# Patient Record
Sex: FEMALE | Race: ASIAN | Employment: STUDENT | URBAN - METROPOLITAN AREA
[De-identification: names, ages, dates, MRNs, and addresses within clinical notes are randomized per-mention and may not be internally consistent; named-entity substitution may affect disease eponyms.]

---
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
## 2024-01-09 ENCOUNTER — TELEPHONE (OUTPATIENT)
Dept: BEHAVIORAL HEALTH | Age: 19
End: 2024-01-09

## 2024-01-22 ENCOUNTER — EXTERNAL RECORD (OUTPATIENT)
Dept: HEALTH INFORMATION MANAGEMENT | Facility: OTHER | Age: 19
End: 2024-01-22

## 2024-01-22 ENCOUNTER — TELEPHONE (OUTPATIENT)
Dept: BEHAVIORAL HEALTH | Age: 19
End: 2024-01-22

## 2024-01-23 ENCOUNTER — EXTERNAL RECORD (OUTPATIENT)
Dept: HEALTH INFORMATION MANAGEMENT | Facility: OTHER | Age: 19
End: 2024-01-23

## 2024-01-23 ENCOUNTER — EXTERNAL LAB (OUTPATIENT)
Dept: OTHER | Age: 19
End: 2024-01-23

## 2024-01-23 LAB
ALBUMIN SERPL-MCNC: 4.8 GM/DL (ref 3.2–4.9)
ALP SERPL-CCNC: 42 U/L (ref 34–143)
ALT SERPL-CCNC: 13 U/L (ref 0–42)
AST SERPL-CCNC: 27 U/L (ref 9–35)
BILIRUB SERPL-MCNC: 0.73 MG/DL (ref 0.2–1.3)
BUN SERPL-MCNC: 14 MG/DL (ref 5–20)
CALCIUM SERPL-MCNC: 9.8 MG/DL (ref 8.7–10.5)
CHLORIDE SERPL-SCNC: 105 MEQ/L (ref 99–110)
CHOLEST SERPL-MCNC: 137 MG/DL (ref 110–200)
CHOLEST/HDLC SERPL: 2.39 RATIO
CO2 SERPL-SCNC: 28 MM/L (ref 20–30)
CREAT SERPL-MCNC: 0.71 MG/DL (ref 0.44–1.32)
ERYTHROCYTE [DISTWIDTH] IN BLOOD: 12.7 % (ref 11.5–15.2)
FOLATE SERPL-MCNC: >24 NG/ML (ref 3.37–24)
GLUCOSE SERPL-MCNC: 111 MG/DL (ref 70–110)
HCT VFR BLD CALC: 39.9 % (ref 37–47)
HDLC SERPL-MCNC: 57 MG/DL (ref 50–96)
HGB BLD-MCNC: 13.1 GM/DL (ref 12–16)
LDL/HDL (OFFPRE3): 1.14 RATIO
LDLC SERPL CALC-MCNC: 65 MG/DL (ref 50–130)
LENGTH OF FAST TIME PATIENT: ABNORMAL H
LENGTH OF FAST TIME PATIENT: NORMAL H
MCH RBC QN AUTO: 30.5 UUG (ref 17–33)
MCHC RBC AUTO-ENTMCNC: 32.8 GM/DL (ref 32–36)
MCV RBC AUTO: 93 UM3 (ref 81–99)
MPV (OFFPRE2): 9.6 UM3 (ref 9.5–13.1)
PLATELET # BLD: 219 THO/MM3 (ref 150–400)
POTASSIUM SERPL-SCNC: 4.4 MEQ/L (ref 3.6–5)
PROT SERPL-MCNC: 7.5 GM/DL (ref 6–8.2)
RBC # BLD: 4.29 MIL/MM3 (ref 4.2–5.4)
SODIUM SERPL-SCNC: 140 MEQ/L (ref 138–147)
TRIGL SERPL-MCNC: 72 MG/DL (ref 10–150)
TSH SERPL-ACNC: 0.79 UIU/ML (ref 0.55–4.78)
VALPROATE SERPL-MCNC: 26.7 SEE REPORT FOR UNITS (ref 50–100)
VIT B12 SERPL-MCNC: 1109 PG/ML (ref 211–911)
WBC # BLD: 4.49 THO/MM3 (ref 4.8–10.8)

## 2024-01-24 ENCOUNTER — EXTERNAL RECORD (OUTPATIENT)
Dept: HEALTH INFORMATION MANAGEMENT | Facility: OTHER | Age: 19
End: 2024-01-24

## 2024-01-25 ENCOUNTER — EXTERNAL RECORD (OUTPATIENT)
Dept: HEALTH INFORMATION MANAGEMENT | Facility: OTHER | Age: 19
End: 2024-01-25

## 2024-02-08 ENCOUNTER — APPOINTMENT (OUTPATIENT)
Dept: BEHAVIORAL HEALTH | Age: 19
End: 2024-02-08

## 2024-02-08 DIAGNOSIS — F42.2 MIXED OBSESSIONAL THOUGHTS AND ACTS: ICD-10-CM

## 2024-02-08 DIAGNOSIS — F84.0 AUTISM SPECTRUM DISORDER: Primary | ICD-10-CM

## 2024-02-08 DIAGNOSIS — Z79.899 MEDICATION MANAGEMENT: ICD-10-CM

## 2024-02-08 RX ORDER — PERPHENAZINE 8 MG/1
16 TABLET ORAL NIGHTLY
COMMUNITY
Start: 2024-02-03

## 2024-02-08 RX ORDER — DIVALPROEX SODIUM 125 MG/1
125 TABLET, DELAYED RELEASE ORAL NIGHTLY
COMMUNITY
Start: 2024-02-03

## 2024-02-08 RX ORDER — FLUVOXAMINE MALEATE 50 MG/1
50 TABLET, COATED ORAL NIGHTLY
COMMUNITY
Start: 2024-02-03

## 2024-02-08 RX ORDER — FLUVOXAMINE MALEATE 25 MG
25 TABLET ORAL NIGHTLY
COMMUNITY
Start: 2024-02-03 | End: 2024-02-08 | Stop reason: DRUGHIGH

## 2024-02-08 RX ORDER — DIVALPROEX SODIUM 250 MG/1
250 TABLET, DELAYED RELEASE ORAL NIGHTLY
COMMUNITY
Start: 2024-02-03

## 2024-02-29 ENCOUNTER — APPOINTMENT (OUTPATIENT)
Dept: BEHAVIORAL HEALTH | Age: 19
End: 2024-02-29

## 2024-02-29 DIAGNOSIS — Z79.899 MEDICATION MANAGEMENT: ICD-10-CM

## 2024-02-29 DIAGNOSIS — F42.2 MIXED OBSESSIONAL THOUGHTS AND ACTS: ICD-10-CM

## 2024-02-29 DIAGNOSIS — F84.0 AUTISM SPECTRUM DISORDER: Primary | ICD-10-CM

## 2024-02-29 RX ORDER — CHLORPROMAZINE HYDROCHLORIDE 100 MG/1
100 TABLET, FILM COATED ORAL NIGHTLY
COMMUNITY
Start: 2024-02-28

## 2024-02-29 RX ORDER — PALIPERIDONE 9 MG/1
9 TABLET, EXTENDED RELEASE ORAL EVERY MORNING
Qty: 90 TABLET | Refills: 0 | Status: SHIPPED | COMMUNITY
Start: 2024-02-29

## 2024-02-29 RX ORDER — DIVALPROEX SODIUM 500 MG/1
1000 TABLET, EXTENDED RELEASE ORAL NIGHTLY
COMMUNITY
Start: 2024-02-28

## 2024-02-29 RX ORDER — PALIPERIDONE 3 MG/1
3 TABLET, EXTENDED RELEASE ORAL EVERY MORNING
Qty: 90 TABLET | Refills: 0 | Status: SHIPPED | COMMUNITY
Start: 2024-02-29

## 2024-03-07 ENCOUNTER — EXTERNAL RECORD (OUTPATIENT)
Dept: HEALTH INFORMATION MANAGEMENT | Facility: OTHER | Age: 19
End: 2024-03-07

## 2024-03-13 ENCOUNTER — EXTERNAL LAB (OUTPATIENT)
Dept: HEALTH INFORMATION MANAGEMENT | Facility: OTHER | Age: 19
End: 2024-03-13

## 2024-03-13 LAB
ALBUMIN SERPL-MCNC: 4.3 G/DL (ref 4–5)
ALBUMIN/GLOB SERPL: 2 {RATIO} (ref 1.2–2.2)
ALP SERPL-CCNC: 37 IU/L (ref 42–106)
ALT SERPL-CCNC: 6 IU/L (ref 0–32)
ANION GAP SERPL CALC-SCNC: 11 MMOL/L (ref 10–18)
AST SERPL-CCNC: 13 IU/L (ref 0–40)
BASOPHILS # BLD: 0 X10E3/UL (ref 0–0.2)
BASOPHILS NFR BLD: 0 %
BILIRUB CONJ SERPL-MCNC: 0.11 MG/DL (ref 0–0.4)
BILIRUB SERPL-MCNC: 0.4 MG/DL (ref 0–1.2)
BUN SERPL-MCNC: 10 MG/DL (ref 6–20)
BUN/CREAT SERPL: 13 (ref 9–23)
CALCIUM SERPL-MCNC: 9.2 MG/DL (ref 8.7–10.2)
CHLORIDE SERPL-SCNC: 103 MMOL/L (ref 96–106)
CHOLEST SERPL-MCNC: 130 MG/DL (ref 100–169)
CO2 SERPL-SCNC: 25 MMOL/L (ref 20–29)
CREAT SERPL-MCNC: 0.78 MG/DL (ref 0.57–1)
EOSINOPHIL # BLD: 0 X10E3/UL (ref 0–0.4)
EOSINOPHIL NFR BLD: 0 %
ERYTHROCYTE [DISTWIDTH] IN BLOOD: 13.2 % (ref 11.7–15.4)
GFR SERPLBLD SCHWARTZ-ARVRAT: 112 ML/MIN/1.73
GLOBULIN SER-MCNC: 2.2 G/DL (ref 1.5–4.5)
GLUCOSE SERPL-MCNC: 88 MG/DL (ref 70–99)
HBA1C MFR BLD: 4.9 % (ref 4.8–5.6)
HCT VFR BLD CALC: 34.1 % (ref 34–46.6)
HDLC SERPL-MCNC: 49 MG/DL
HGB BLD-MCNC: 11.4 G/DL (ref 11.1–15.9)
LDH SERPL L TO P-CCNC: 72 IU/L (ref 119–226)
LDLC SERPL CALC-MCNC: 67 MG/DL (ref 0–109)
LENGTH OF FAST TIME PATIENT: NO H
LENGTH OF FAST TIME PATIENT: NO H
LYMPHOCYTES # BLD: 1.7 X10E3/UL (ref 0.7–3.1)
LYMPHOCYTES NFR BLD: 35 %
MCH RBC QN AUTO: 29.6 PG (ref 26.6–33)
MCHC RBC AUTO-ENTMCNC: 33.4 G/DL (ref 31.5–35.7)
MCV RBC AUTO: 89 FL (ref 79–97)
MONOCYTES # BLD: 0.6 X10E3/UL (ref 0.1–0.9)
MONOCYTES NFR BLD: 12 %
NEUTROPHILS # BLD: 2.5 X10E3/UL (ref 1.4–7)
NEUTROPHILS NFR BLD: 53 %
PHOSPHATE SERPL-MCNC: 3.9 MG/DL (ref 3.3–5.1)
PLATELET # BLD: 137 X10E3/UL (ref 150–450)
POTASSIUM SERPL-SCNC: 4.1 MMOL/L (ref 3.5–5.2)
PROT SERPL-MCNC: 6.5 G/DL (ref 6–8.5)
RBC # BLD: 3.85 X10E6/UL (ref 3.77–5.28)
RPR SER QL: NON REACTIVE
SODIUM SERPL-SCNC: 139 MMOL/L (ref 134–144)
T4 FREE SERPL-MCNC: 1.4 NG/DL (ref 0.93–1.6)
TRIGL SERPL-MCNC: 66 MG/DL (ref 0–89)
TSH SERPL-ACNC: 1.72 UIU/ML (ref 0.45–4.5)
URATE SERPL-MCNC: 4.5 MG/DL (ref 2.6–6.2)
VALPROATE SERPL-MCNC: 116 UG/ML (ref 50–100)
VLDLC SERPL CALC-MCNC: 14 MG/DL (ref 5–40)
WBC # BLD: 4.8 X10E3/UL (ref 3.4–10.8)

## 2024-03-19 ENCOUNTER — APPOINTMENT (OUTPATIENT)
Dept: BEHAVIORAL HEALTH | Age: 19
End: 2024-03-19

## 2024-03-19 ENCOUNTER — EXTERNAL RECORD (OUTPATIENT)
Dept: HEALTH INFORMATION MANAGEMENT | Facility: OTHER | Age: 19
End: 2024-03-19

## 2024-03-19 DIAGNOSIS — Z79.899 MEDICATION MANAGEMENT: ICD-10-CM

## 2024-03-19 DIAGNOSIS — F84.0 AUTISM SPECTRUM DISORDER: Primary | ICD-10-CM

## 2024-03-19 DIAGNOSIS — F42.2 MIXED OBSESSIONAL THOUGHTS AND ACTS: ICD-10-CM

## 2024-03-23 ENCOUNTER — EXTERNAL LAB (OUTPATIENT)
Dept: HEALTH INFORMATION MANAGEMENT | Facility: OTHER | Age: 19
End: 2024-03-23

## 2024-03-23 LAB — VALPROATE SERPL-MCNC: 89 UG/ML (ref 50–100)

## 2024-03-28 ENCOUNTER — EXTERNAL LAB (OUTPATIENT)
Dept: HEALTH INFORMATION MANAGEMENT | Facility: OTHER | Age: 19
End: 2024-03-28

## 2024-03-28 LAB
CREAT SERPL-MCNC: 0.69 MG/DL (ref 0.57–1)
GFR SERPLBLD SCHWARTZ-ARVRAT: 128 ML/MIN/1.73
LITHIUM SERPL-SCNC: 0.5 MMOL/L (ref 0.5–1.2)

## 2024-04-01 ENCOUNTER — EXTERNAL RECORD (OUTPATIENT)
Dept: HEALTH INFORMATION MANAGEMENT | Facility: OTHER | Age: 19
End: 2024-04-01

## 2024-04-02 ENCOUNTER — EXTERNAL LAB (OUTPATIENT)
Dept: HEALTH INFORMATION MANAGEMENT | Facility: OTHER | Age: 19
End: 2024-04-02

## 2024-04-02 ENCOUNTER — EXTERNAL RECORD (OUTPATIENT)
Dept: HEALTH INFORMATION MANAGEMENT | Facility: OTHER | Age: 19
End: 2024-04-02

## 2024-04-02 LAB — LITHIUM SERPL-SCNC: 0.8 MMOL/L (ref 0.5–1.2)

## 2024-04-06 ENCOUNTER — EXTERNAL RECORD (OUTPATIENT)
Dept: HEALTH INFORMATION MANAGEMENT | Facility: OTHER | Age: 19
End: 2024-04-06

## 2024-04-07 ENCOUNTER — EXTERNAL RECORD (OUTPATIENT)
Dept: HEALTH INFORMATION MANAGEMENT | Facility: OTHER | Age: 19
End: 2024-04-07

## 2024-04-09 ENCOUNTER — TELEPHONE (OUTPATIENT)
Dept: BEHAVIORAL HEALTH | Age: 19
End: 2024-04-09

## 2024-04-13 ENCOUNTER — EXTERNAL RECORD (OUTPATIENT)
Dept: HEALTH INFORMATION MANAGEMENT | Facility: OTHER | Age: 19
End: 2024-04-13

## 2024-04-14 ENCOUNTER — EXTERNAL RECORD (OUTPATIENT)
Dept: HEALTH INFORMATION MANAGEMENT | Facility: OTHER | Age: 19
End: 2024-04-14

## 2024-04-15 ENCOUNTER — EXTERNAL RECORD (OUTPATIENT)
Dept: HEALTH INFORMATION MANAGEMENT | Facility: OTHER | Age: 19
End: 2024-04-15

## 2024-04-16 ENCOUNTER — APPOINTMENT (OUTPATIENT)
Dept: BEHAVIORAL HEALTH | Age: 19
End: 2024-04-16

## 2024-04-21 ENCOUNTER — EXTERNAL LAB (OUTPATIENT)
Dept: HEALTH INFORMATION MANAGEMENT | Facility: OTHER | Age: 19
End: 2024-04-21

## 2024-04-21 LAB — LITHIUM SERPL-SCNC: 0.6 MMOL/L (ref 0.5–1.2)

## 2024-04-23 ENCOUNTER — TELEPHONE (OUTPATIENT)
Dept: BEHAVIORAL HEALTH | Age: 19
End: 2024-04-23

## 2024-04-25 ENCOUNTER — E-ADVICE (OUTPATIENT)
Dept: BEHAVIORAL HEALTH | Age: 19
End: 2024-04-25

## 2024-04-28 ENCOUNTER — EXTERNAL RECORD (OUTPATIENT)
Dept: HEALTH INFORMATION MANAGEMENT | Facility: OTHER | Age: 19
End: 2024-04-28

## 2024-04-29 ENCOUNTER — E-ADVICE (OUTPATIENT)
Dept: BEHAVIORAL HEALTH | Age: 19
End: 2024-04-29

## 2024-04-30 ENCOUNTER — TELEPHONE (OUTPATIENT)
Dept: BEHAVIORAL HEALTH | Age: 19
End: 2024-04-30

## 2024-04-30 ENCOUNTER — E-ADVICE (OUTPATIENT)
Dept: BEHAVIORAL HEALTH | Age: 19
End: 2024-04-30

## 2024-05-09 ENCOUNTER — APPOINTMENT (OUTPATIENT)
Dept: BEHAVIORAL HEALTH | Age: 19
End: 2024-05-09

## 2024-05-09 DIAGNOSIS — F84.0 AUTISM SPECTRUM DISORDER (CMD): Primary | ICD-10-CM

## 2024-05-09 DIAGNOSIS — Z79.899 MEDICATION MANAGEMENT: ICD-10-CM

## 2024-05-28 ENCOUNTER — E-ADVICE (OUTPATIENT)
Dept: BEHAVIORAL HEALTH | Age: 19
End: 2024-05-28

## 2024-06-06 ENCOUNTER — TELEPHONE (OUTPATIENT)
Dept: BEHAVIORAL HEALTH | Age: 19
End: 2024-06-06

## 2024-06-06 ENCOUNTER — APPOINTMENT (OUTPATIENT)
Dept: BEHAVIORAL HEALTH | Age: 19
End: 2024-06-06

## 2024-06-06 RX ORDER — LITHIUM CARBONATE 600 MG/1
600 CAPSULE ORAL NIGHTLY
Qty: 90 CAPSULE | Refills: 0 | Status: SHIPPED | OUTPATIENT
Start: 2024-06-06

## 2024-06-06 RX ORDER — LITHIUM CARBONATE 600 MG/1
600 CAPSULE ORAL NIGHTLY
COMMUNITY
Start: 2024-04-01 | End: 2024-06-06 | Stop reason: SDUPTHER

## 2024-06-06 RX ORDER — PALIPERIDONE 6 MG/1
6 TABLET, EXTENDED RELEASE ORAL NIGHTLY
Qty: 90 TABLET | Refills: 0 | Status: SHIPPED | OUTPATIENT
Start: 2024-06-06

## 2024-06-13 ENCOUNTER — APPOINTMENT (OUTPATIENT)
Dept: BEHAVIORAL HEALTH | Age: 19
End: 2024-06-13

## 2024-06-14 ENCOUNTER — LAB SERVICES (OUTPATIENT)
Dept: LAB | Age: 19
End: 2024-06-14

## 2024-06-14 ENCOUNTER — MED INFO FORMS (OUTPATIENT)
Dept: BEHAVIORAL HEALTH | Age: 19
End: 2024-06-14

## 2024-06-14 DIAGNOSIS — F84.0 AUTISM SPECTRUM DISORDER (CMD): ICD-10-CM

## 2024-06-14 DIAGNOSIS — Z79.899 MEDICATION MANAGEMENT: ICD-10-CM

## 2024-06-14 DIAGNOSIS — F84.0 AUTISM (CMD): ICD-10-CM

## 2024-06-14 LAB
ALBUMIN SERPL-MCNC: 4.7 G/DL (ref 3.6–5.1)
ALBUMIN/GLOB SERPL: 1.5 {RATIO} (ref 1–2.4)
ALP SERPL-CCNC: 42 UNITS/L (ref 42–110)
ALT SERPL-CCNC: 21 UNITS/L
ANION GAP SERPL CALC-SCNC: 15 MMOL/L (ref 7–19)
AST SERPL-CCNC: 6 UNITS/L
BASOPHILS # BLD: 0 K/MCL (ref 0–0.3)
BASOPHILS NFR BLD: 0 %
BILIRUB SERPL-MCNC: 0.7 MG/DL (ref 0.2–1)
BUN SERPL-MCNC: 4 MG/DL (ref 6–20)
BUN/CREAT SERPL: 6 (ref 7–25)
CALCIUM SERPL-MCNC: 9.9 MG/DL (ref 8.4–10.2)
CHLORIDE SERPL-SCNC: 103 MMOL/L (ref 97–110)
CO2 SERPL-SCNC: 25 MMOL/L (ref 21–32)
CREAT SERPL-MCNC: 0.63 MG/DL (ref 0.51–0.95)
DEPRECATED RDW RBC: 42.2 FL (ref 39–50)
EGFRCR SERPLBLD CKD-EPI 2021: >90 ML/MIN/{1.73_M2}
EOSINOPHIL # BLD: 0 K/MCL (ref 0–0.5)
EOSINOPHIL NFR BLD: 0 %
ERYTHROCYTE [DISTWIDTH] IN BLOOD: 13.2 % (ref 11–15)
FASTING DURATION TIME PATIENT: ABNORMAL H
GLOBULIN SER-MCNC: 3.2 G/DL (ref 2–4)
GLUCOSE SERPL-MCNC: 85 MG/DL (ref 70–99)
HCT VFR BLD CALC: 36.7 % (ref 36–46.5)
HGB BLD-MCNC: 11.5 G/DL (ref 12–15.5)
IMM GRANULOCYTES # BLD AUTO: 0 K/MCL (ref 0–0.2)
IMM GRANULOCYTES # BLD: 0 %
LITHIUM SERPL-SCNC: 0.5 MMOL/L (ref 0.6–1.2)
LYMPHOCYTES # BLD: 1.1 K/MCL (ref 1.2–5.2)
LYMPHOCYTES NFR BLD: 22 %
MCH RBC QN AUTO: 27.8 PG (ref 26–34)
MCHC RBC AUTO-ENTMCNC: 31.3 G/DL (ref 32–36.5)
MCV RBC AUTO: 88.6 FL (ref 78–100)
MONOCYTES # BLD: 0.4 K/MCL (ref 0.3–0.9)
MONOCYTES NFR BLD: 9 %
NEUTROPHILS # BLD: 3.4 K/MCL (ref 1.8–8)
NEUTROPHILS NFR BLD: 69 %
NRBC BLD MANUAL-RTO: 0 /100 WBC
PLATELET # BLD AUTO: 275 K/MCL (ref 140–450)
POTASSIUM SERPL-SCNC: 3.7 MMOL/L (ref 3.4–5.1)
PROT SERPL-MCNC: 7.9 G/DL (ref 6.4–8.2)
RBC # BLD: 4.14 MIL/MCL (ref 4–5.2)
SODIUM SERPL-SCNC: 139 MMOL/L (ref 135–145)
TSH SERPL-ACNC: 2.17 MCUNITS/ML (ref 0.46–4.13)
VALPROATE SERPL-MCNC: <3 MCG/ML (ref 50–125)
VIT B12 SERPL-MCNC: 903 PG/ML (ref 211–911)
WBC # BLD: 4.9 K/MCL (ref 4.2–11)

## 2024-06-14 PROCEDURE — 80050 GENERAL HEALTH PANEL: CPT | Performed by: INTERNAL MEDICINE

## 2024-06-14 PROCEDURE — 82607 VITAMIN B-12: CPT | Performed by: CLINICAL MEDICAL LABORATORY

## 2024-06-14 PROCEDURE — 36415 COLL VENOUS BLD VENIPUNCTURE: CPT | Performed by: PSYCHIATRY & NEUROLOGY

## 2024-06-14 PROCEDURE — 80178 ASSAY OF LITHIUM: CPT | Performed by: CLINICAL MEDICAL LABORATORY

## 2024-06-14 PROCEDURE — 80164 ASSAY DIPROPYLACETIC ACD TOT: CPT | Performed by: CLINICAL MEDICAL LABORATORY

## 2024-06-17 ENCOUNTER — E-ADVICE (OUTPATIENT)
Dept: HEALTH INFORMATION MANAGEMENT | Facility: OTHER | Age: 19
End: 2024-06-17

## 2024-06-20 ENCOUNTER — APPOINTMENT (OUTPATIENT)
Dept: BEHAVIORAL HEALTH | Age: 19
End: 2024-06-20

## 2024-06-20 DIAGNOSIS — F84.0 AUTISM SPECTRUM DISORDER (CMD): Primary | ICD-10-CM

## 2024-06-20 DIAGNOSIS — F42.2 MIXED OBSESSIONAL THOUGHTS AND ACTS: ICD-10-CM

## 2024-06-20 RX ORDER — PALIPERIDONE 3 MG/1
3 TABLET, EXTENDED RELEASE ORAL EVERY MORNING
Qty: 60 TABLET | Refills: 2 | Status: SHIPPED | OUTPATIENT
Start: 2024-06-20

## 2024-06-20 RX ORDER — LITHIUM CARBONATE 150 MG/1
CAPSULE ORAL
Qty: 90 CAPSULE | Refills: 3 | Status: SHIPPED | OUTPATIENT
Start: 2024-06-20

## 2024-07-17 ENCOUNTER — APPOINTMENT (OUTPATIENT)
Dept: BEHAVIORAL HEALTH | Age: 19
End: 2024-07-17

## 2024-07-17 DIAGNOSIS — F84.0 AUTISM SPECTRUM DISORDER (CMD): Primary | ICD-10-CM

## 2024-07-17 DIAGNOSIS — F42.2 MIXED OBSESSIONAL THOUGHTS AND ACTS: ICD-10-CM

## 2024-07-17 DIAGNOSIS — F29 PSYCHOSIS, UNSPECIFIED PSYCHOSIS TYPE  (CMD): ICD-10-CM

## 2024-07-17 RX ORDER — LITHIUM CARBONATE 300 MG/1
600 CAPSULE ORAL AT BEDTIME
COMMUNITY
Start: 2024-07-15

## 2024-07-17 RX ORDER — LITHIUM CARBONATE 150 MG/1
150 CAPSULE ORAL DAILY
Qty: 30 CAPSULE | Refills: 1 | Status: SHIPPED | OUTPATIENT
Start: 2024-07-17

## 2024-07-17 RX ORDER — PALIPERIDONE 6 MG/1
12 TABLET, EXTENDED RELEASE ORAL NIGHTLY
COMMUNITY
Start: 2024-04-01

## 2024-08-12 RX ORDER — PALIPERIDONE 6 MG/1
12 TABLET, EXTENDED RELEASE ORAL NIGHTLY
Qty: 180 TABLET | Refills: 0 | Status: SHIPPED | OUTPATIENT
Start: 2024-08-12

## 2024-08-12 RX ORDER — LITHIUM CARBONATE 300 MG/1
600 CAPSULE ORAL AT BEDTIME
Qty: 180 CAPSULE | Refills: 0 | Status: SHIPPED | OUTPATIENT
Start: 2024-08-12

## 2024-08-21 ENCOUNTER — APPOINTMENT (OUTPATIENT)
Dept: BEHAVIORAL HEALTH | Age: 19
End: 2024-08-21

## 2024-08-21 DIAGNOSIS — F84.0 AUTISM SPECTRUM DISORDER (CMD): Primary | ICD-10-CM

## 2024-08-21 DIAGNOSIS — Z79.899 MEDICATION MANAGEMENT: ICD-10-CM

## 2024-08-21 RX ORDER — LITHIUM CARBONATE 150 MG/1
150 CAPSULE ORAL DAILY
Qty: 30 CAPSULE | Refills: 1 | Status: SHIPPED | OUTPATIENT
Start: 2024-08-21

## 2024-08-21 RX ORDER — LITHIUM CARBONATE 300 MG/1
600 CAPSULE ORAL AT BEDTIME
Qty: 180 CAPSULE | Refills: 0 | Status: SHIPPED | OUTPATIENT
Start: 2024-08-21

## 2024-08-21 RX ORDER — PALIPERIDONE 6 MG/1
12 TABLET, EXTENDED RELEASE ORAL NIGHTLY
Qty: 180 TABLET | Refills: 0 | Status: SHIPPED | OUTPATIENT
Start: 2024-08-21

## 2024-09-03 ENCOUNTER — E-ADVICE (OUTPATIENT)
Dept: BEHAVIORAL HEALTH | Age: 19
End: 2024-09-03

## 2024-09-03 DIAGNOSIS — Z79.899 MEDICATION MANAGEMENT: ICD-10-CM

## 2024-09-03 DIAGNOSIS — F84.0 AUTISM SPECTRUM DISORDER (CMD): Primary | ICD-10-CM

## 2024-09-07 ENCOUNTER — APPOINTMENT (OUTPATIENT)
Dept: LAB | Age: 19
End: 2024-09-07

## 2024-09-07 LAB
ALBUMIN SERPL-MCNC: 4.3 G/DL (ref 3.6–5.1)
ALBUMIN/GLOB SERPL: 1.4 {RATIO} (ref 1–2.4)
ALP SERPL-CCNC: 46 UNITS/L (ref 42–110)
ALT SERPL-CCNC: 11 UNITS/L
ANION GAP SERPL CALC-SCNC: 9 MMOL/L (ref 7–19)
AST SERPL-CCNC: 10 UNITS/L
BASOPHILS # BLD: 0 K/MCL (ref 0–0.3)
BASOPHILS NFR BLD: 1 %
BILIRUB SERPL-MCNC: 0.7 MG/DL (ref 0.2–1)
BUN SERPL-MCNC: 5 MG/DL (ref 6–20)
BUN/CREAT SERPL: 7 (ref 7–25)
CALCIUM SERPL-MCNC: 9.8 MG/DL (ref 8.4–10.2)
CHLORIDE SERPL-SCNC: 110 MMOL/L (ref 97–110)
CO2 SERPL-SCNC: 26 MMOL/L (ref 21–32)
CREAT SERPL-MCNC: 0.68 MG/DL (ref 0.51–0.95)
DEPRECATED RDW RBC: 44.4 FL (ref 39–50)
EGFRCR SERPLBLD CKD-EPI 2021: >90 ML/MIN/{1.73_M2}
EOSINOPHIL # BLD: 0.1 K/MCL (ref 0–0.5)
EOSINOPHIL NFR BLD: 1 %
ERYTHROCYTE [DISTWIDTH] IN BLOOD: 13.8 % (ref 11–15)
FASTING DURATION TIME PATIENT: 1 HOURS (ref 0–999)
GLOBULIN SER-MCNC: 3 G/DL (ref 2–4)
GLUCOSE SERPL-MCNC: 82 MG/DL (ref 70–99)
HCT VFR BLD CALC: 36.7 % (ref 36–46.5)
HGB BLD-MCNC: 11.3 G/DL (ref 12–15.5)
IMM GRANULOCYTES # BLD AUTO: 0 K/MCL (ref 0–0.2)
IMM GRANULOCYTES # BLD: 0 %
IRON SATN MFR SERPL: 11 % (ref 15–45)
IRON SERPL-MCNC: 45 MCG/DL (ref 50–170)
LITHIUM SERPL-SCNC: 0.6 MMOL/L (ref 0.6–1.2)
LYMPHOCYTES # BLD: 1.4 K/MCL (ref 1.2–5.2)
LYMPHOCYTES NFR BLD: 21 %
MCH RBC QN AUTO: 26.9 PG (ref 26–34)
MCHC RBC AUTO-ENTMCNC: 30.8 G/DL (ref 32–36.5)
MCV RBC AUTO: 87.4 FL (ref 78–100)
MONOCYTES # BLD: 0.4 K/MCL (ref 0.3–0.9)
MONOCYTES NFR BLD: 6 %
NEUTROPHILS # BLD: 4.5 K/MCL (ref 1.8–8)
NEUTROPHILS NFR BLD: 71 %
NRBC BLD MANUAL-RTO: 0 /100 WBC
PLATELET # BLD AUTO: 294 K/MCL (ref 140–450)
POTASSIUM SERPL-SCNC: 4 MMOL/L (ref 3.4–5.1)
PROT SERPL-MCNC: 7.3 G/DL (ref 6.4–8.2)
RBC # BLD: 4.2 MIL/MCL (ref 4–5.2)
SODIUM SERPL-SCNC: 141 MMOL/L (ref 135–145)
TIBC SERPL-MCNC: 421 MCG/DL (ref 250–450)
WBC # BLD: 6.3 K/MCL (ref 4.2–11)

## 2024-09-08 LAB
TSH SERPL-ACNC: 1.14 MCUNITS/ML (ref 0.46–4.13)
VIT B12 SERPL-MCNC: 933 PG/ML (ref 211–911)

## 2024-09-13 ENCOUNTER — E-ADVICE (OUTPATIENT)
Dept: BEHAVIORAL HEALTH | Age: 19
End: 2024-09-13

## 2024-09-18 ENCOUNTER — APPOINTMENT (OUTPATIENT)
Dept: BEHAVIORAL HEALTH | Age: 19
End: 2024-09-18

## 2024-09-18 DIAGNOSIS — F84.0 AUTISM SPECTRUM DISORDER (CMD): Primary | ICD-10-CM

## 2024-09-18 DIAGNOSIS — F29 PSYCHOSIS, UNSPECIFIED PSYCHOSIS TYPE  (CMD): ICD-10-CM

## 2024-09-18 DIAGNOSIS — Z79.899 MEDICATION MANAGEMENT: ICD-10-CM

## 2024-09-18 RX ORDER — LITHIUM CARBONATE 300 MG/1
600 CAPSULE ORAL AT BEDTIME
Qty: 180 CAPSULE | Refills: 0 | Status: SHIPPED | OUTPATIENT
Start: 2024-09-18

## 2024-09-18 RX ORDER — PALIPERIDONE 6 MG/1
12 TABLET, EXTENDED RELEASE ORAL NIGHTLY
Qty: 180 TABLET | Refills: 0 | Status: SHIPPED | OUTPATIENT
Start: 2024-09-18

## 2024-09-18 RX ORDER — LITHIUM CARBONATE 150 MG/1
150 CAPSULE ORAL DAILY
Qty: 90 CAPSULE | Refills: 0 | Status: SHIPPED | OUTPATIENT
Start: 2024-09-18

## 2024-10-18 ENCOUNTER — MED INFO FORMS (OUTPATIENT)
Dept: HEALTH INFORMATION MANAGEMENT | Facility: OTHER | Age: 19
End: 2024-10-18

## 2024-10-29 ENCOUNTER — APPOINTMENT (OUTPATIENT)
Dept: BEHAVIORAL HEALTH | Age: 19
End: 2024-10-29

## 2024-10-29 DIAGNOSIS — Z79.899 MEDICATION MANAGEMENT: ICD-10-CM

## 2024-10-29 DIAGNOSIS — F84.0 AUTISM SPECTRUM DISORDER (CMD): Primary | ICD-10-CM

## 2024-10-29 DIAGNOSIS — F29 PSYCHOSIS, UNSPECIFIED PSYCHOSIS TYPE  (CMD): ICD-10-CM

## 2024-10-29 RX ORDER — LITHIUM CARBONATE 300 MG/1
600 CAPSULE ORAL AT BEDTIME
Qty: 180 CAPSULE | Refills: 0 | Status: SHIPPED | OUTPATIENT
Start: 2024-10-29

## 2024-10-29 RX ORDER — PALIPERIDONE 6 MG/1
12 TABLET, EXTENDED RELEASE ORAL NIGHTLY
Qty: 180 TABLET | Refills: 0 | Status: SHIPPED | OUTPATIENT
Start: 2024-10-29

## 2024-10-29 RX ORDER — LITHIUM CARBONATE 150 MG/1
150 CAPSULE ORAL DAILY
Qty: 90 CAPSULE | Refills: 0 | Status: SHIPPED | OUTPATIENT
Start: 2024-10-29

## 2024-10-30 ENCOUNTER — TELEPHONE (OUTPATIENT)
Dept: BEHAVIORAL HEALTH | Age: 19
End: 2024-10-30

## 2024-10-31 ENCOUNTER — E-ADVICE (OUTPATIENT)
Dept: HEALTH INFORMATION MANAGEMENT | Facility: OTHER | Age: 19
End: 2024-10-31

## 2024-11-08 ENCOUNTER — E-ADVICE (OUTPATIENT)
Dept: BEHAVIORAL HEALTH | Age: 19
End: 2024-11-08

## 2024-11-12 ENCOUNTER — TELEPHONE (OUTPATIENT)
Dept: BEHAVIORAL HEALTH | Age: 19
End: 2024-11-12

## 2024-11-12 ENCOUNTER — E-ADVICE (OUTPATIENT)
Dept: BEHAVIORAL HEALTH | Age: 19
End: 2024-11-12

## 2024-11-12 DIAGNOSIS — F29 PSYCHOSIS, UNSPECIFIED PSYCHOSIS TYPE  (CMD): Primary | ICD-10-CM

## 2024-11-12 RX ORDER — PALIPERIDONE 6 MG/1
TABLET, EXTENDED RELEASE ORAL
COMMUNITY
Start: 2024-11-12

## 2024-11-12 RX ORDER — LITHIUM CARBONATE 150 MG/1
CAPSULE ORAL
COMMUNITY
Start: 2024-11-12

## 2024-11-14 RX ORDER — LITHIUM CARBONATE 300 MG/1
600 CAPSULE ORAL AT BEDTIME
Qty: 180 CAPSULE | Refills: 0 | OUTPATIENT
Start: 2024-11-14

## 2024-11-16 ENCOUNTER — LAB SERVICES (OUTPATIENT)
Dept: LAB | Age: 19
End: 2024-11-16

## 2024-11-16 DIAGNOSIS — Z79.899 MEDICATION MANAGEMENT: ICD-10-CM

## 2024-11-16 DIAGNOSIS — F29 PSYCHOSIS, UNSPECIFIED PSYCHOSIS TYPE  (CMD): ICD-10-CM

## 2024-11-16 DIAGNOSIS — F84.0 AUTISM SPECTRUM DISORDER (CMD): ICD-10-CM

## 2024-11-16 LAB
ALBUMIN SERPL-MCNC: 4.3 G/DL (ref 3.4–5)
ALBUMIN/GLOB SERPL: 1.3 {RATIO} (ref 1–2.4)
ALP SERPL-CCNC: 62 UNITS/L (ref 45–117)
ALT SERPL-CCNC: 18 UNITS/L
ANION GAP SERPL CALC-SCNC: 13 MMOL/L (ref 7–19)
AST SERPL-CCNC: 11 UNITS/L
BILIRUB SERPL-MCNC: 0.7 MG/DL (ref 0.2–1)
BUN SERPL-MCNC: 7 MG/DL (ref 6–20)
BUN/CREAT SERPL: 12 (ref 7–25)
CALCIUM SERPL-MCNC: 9.5 MG/DL (ref 8.4–10.2)
CHLORIDE SERPL-SCNC: 102 MMOL/L (ref 97–110)
CO2 SERPL-SCNC: 27 MMOL/L (ref 21–32)
CREAT SERPL-MCNC: 0.59 MG/DL (ref 0.51–0.95)
EGFRCR SERPLBLD CKD-EPI 2021: >90 ML/MIN/{1.73_M2}
FASTING DURATION TIME PATIENT: NORMAL H
GLOBULIN SER-MCNC: 3.4 G/DL (ref 2–4)
GLUCOSE SERPL-MCNC: 96 MG/DL (ref 70–99)
IRON SATN MFR SERPL: 8 % (ref 15–45)
IRON SERPL-MCNC: 36 MCG/DL (ref 50–170)
POTASSIUM SERPL-SCNC: 3.8 MMOL/L (ref 3.4–5.1)
PROT SERPL-MCNC: 7.7 G/DL (ref 6.4–8.2)
SODIUM SERPL-SCNC: 138 MMOL/L (ref 135–145)
TIBC SERPL-MCNC: 434 MCG/DL (ref 250–450)

## 2024-11-16 PROCEDURE — 80178 ASSAY OF LITHIUM: CPT | Performed by: CLINICAL MEDICAL LABORATORY

## 2024-11-16 PROCEDURE — 83550 IRON BINDING TEST: CPT | Performed by: INTERNAL MEDICINE

## 2024-11-16 PROCEDURE — 36415 COLL VENOUS BLD VENIPUNCTURE: CPT | Performed by: PSYCHIATRY & NEUROLOGY

## 2024-11-16 PROCEDURE — 80053 COMPREHEN METABOLIC PANEL: CPT | Performed by: INTERNAL MEDICINE

## 2024-11-16 PROCEDURE — 83540 ASSAY OF IRON: CPT | Performed by: INTERNAL MEDICINE

## 2024-11-17 LAB
LITHIUM SERPL-SCNC: 0.6 MMOL/L (ref 0.6–1.2)
PROLACTIN SERPL-MCNC: 125.6 NG/ML (ref 1.8–19.5)

## 2024-11-18 RX ORDER — LITHIUM CARBONATE 300 MG/1
600 CAPSULE ORAL AT BEDTIME
Qty: 60 CAPSULE | Refills: 0 | Status: SHIPPED | OUTPATIENT
Start: 2024-11-18

## 2024-11-20 RX ORDER — PALIPERIDONE 6 MG/1
6 TABLET, EXTENDED RELEASE ORAL EVERY MORNING
Qty: 90 TABLET | Refills: 1 | Status: SHIPPED | OUTPATIENT
Start: 2024-11-20

## 2024-11-27 ENCOUNTER — APPOINTMENT (OUTPATIENT)
Dept: BEHAVIORAL HEALTH | Age: 19
End: 2024-11-27

## 2024-11-27 DIAGNOSIS — F29 PSYCHOSIS, UNSPECIFIED PSYCHOSIS TYPE  (CMD): Primary | ICD-10-CM

## 2024-11-27 DIAGNOSIS — F84.0 AUTISM SPECTRUM DISORDER (CMD): ICD-10-CM

## 2024-11-27 DIAGNOSIS — F42.2 MIXED OBSESSIONAL THOUGHTS AND ACTS: ICD-10-CM

## 2024-11-27 DIAGNOSIS — Z79.899 MEDICATION MANAGEMENT: ICD-10-CM

## 2024-11-27 RX ORDER — PALIPERIDONE 6 MG/1
6 TABLET, EXTENDED RELEASE ORAL 2 TIMES DAILY
Qty: 180 TABLET | Refills: 0 | Status: SHIPPED | OUTPATIENT
Start: 2024-11-27

## 2024-11-27 RX ORDER — LITHIUM CARBONATE 300 MG/1
600 CAPSULE ORAL AT BEDTIME
Qty: 180 CAPSULE | Refills: 0 | Status: SHIPPED | OUTPATIENT
Start: 2024-11-27

## 2024-11-27 RX ORDER — LITHIUM CARBONATE 150 MG/1
150 CAPSULE ORAL DAILY
Qty: 90 CAPSULE | Refills: 0 | Status: SHIPPED | OUTPATIENT
Start: 2024-11-27

## 2025-01-27 ENCOUNTER — E-ADVICE (OUTPATIENT)
Dept: BEHAVIORAL HEALTH | Age: 20
End: 2025-01-27

## 2025-04-11 RX ORDER — PALIPERIDONE 6 MG/1
6 TABLET, EXTENDED RELEASE ORAL 2 TIMES DAILY
Qty: 60 TABLET | Refills: 0 | Status: SHIPPED | OUTPATIENT
Start: 2025-04-11

## (undated) DEVICE — ENDOSCOPY PACK UPPER: Brand: MEDLINE INDUSTRIES, INC.

## (undated) DEVICE — Device: Brand: DEFENDO AIR/WATER/SUCTION AND BIOPSY VALVE

## (undated) DEVICE — FORCEP RADIAL JAW 4

## (undated) DEVICE — FORCEPS BX NDL GASTROSCOPIC

## (undated) NOTE — LETTER
[address]  [address], [place], [place]       INFORMED REFUSAL FOR TREATMENT / PROCEDURE / TESTING      I have been advised by  __Kishor____________________________ that it is necessary for me to undergo the following treatment